# Patient Record
Sex: MALE | Race: WHITE | Employment: OTHER | URBAN - METROPOLITAN AREA
[De-identification: names, ages, dates, MRNs, and addresses within clinical notes are randomized per-mention and may not be internally consistent; named-entity substitution may affect disease eponyms.]

---

## 2020-11-11 PROBLEM — R42 DIZZINESS: Status: ACTIVE | Noted: 2020-11-11

## 2020-11-11 PROBLEM — H90.3 SENSORY HEARING LOSS, BILATERAL: Status: ACTIVE | Noted: 2020-11-11

## 2021-08-06 ENCOUNTER — LAB ENCOUNTER (OUTPATIENT)
Dept: LAB | Facility: HOSPITAL | Age: 71
End: 2021-08-06
Attending: INTERNAL MEDICINE
Payer: MEDICARE

## 2021-08-06 DIAGNOSIS — Z01.818 PRE-OP TESTING: ICD-10-CM

## 2021-08-07 LAB — SARS-COV-2 RNA RESP QL NAA+PROBE: NOT DETECTED

## 2021-08-09 PROBLEM — D12.2 BENIGN NEOPLASM OF ASCENDING COLON: Status: ACTIVE | Noted: 2021-08-09

## 2021-08-09 PROBLEM — D12.3 BENIGN NEOPLASM OF TRANSVERSE COLON: Status: ACTIVE | Noted: 2021-08-09

## 2021-08-09 PROBLEM — Z86.010 HISTORY OF ADENOMATOUS POLYP OF COLON: Status: ACTIVE | Noted: 2021-08-09

## 2021-09-20 ENCOUNTER — HOSPITAL ENCOUNTER (EMERGENCY)
Age: 71
Discharge: HOME | End: 2021-09-20
Payer: MEDICARE

## 2021-09-20 VITALS
RESPIRATION RATE: 18 BRPM | SYSTOLIC BLOOD PRESSURE: 130 MMHG | WEIGHT: 205 LBS | HEART RATE: 75 BPM | OXYGEN SATURATION: 98 % | DIASTOLIC BLOOD PRESSURE: 80 MMHG | HEIGHT: 72 IN | BODY MASS INDEX: 27.77 KG/M2 | TEMPERATURE: 99 F

## 2021-09-20 DIAGNOSIS — L23.7 POISON IVY DERMATITIS: Primary | ICD-10-CM

## 2021-09-20 PROCEDURE — 99283 EMERGENCY DEPT VISIT LOW MDM: CPT

## 2021-09-20 RX ORDER — PREDNISONE 10 MG/1
TABLET ORAL
Qty: 29 TABLET | Refills: 0 | Status: SHIPPED | OUTPATIENT
Start: 2021-09-20

## 2021-09-20 NOTE — ED PROVIDER NOTES
Patient Seen in: THE [facility] In [place]      History   Patient presents with:  Swelling Edema    Stated Complaint: swelling to face, possible reaction to medication for poison ivy    Subjective:   HPI    75-year-old gentleman.   He arri Device None (Room air)       Current:/80   Pulse 75   Temp 99.2 °F (37.3 °C) (Temporal)   Resp 18   Ht 182.9 cm (6')   Wt 93 kg   SpO2 98%   BMI 27.80 kg/m²         Physical Exam    Gen: Well appearing, well groomed, alert and aware x 3  Neck: Supple

## 2023-12-04 ENCOUNTER — TELEPHONE (OUTPATIENT)
Dept: NEUROLOGY | Facility: CLINIC | Age: 73
End: 2023-12-04

## 2023-12-04 NOTE — TELEPHONE ENCOUNTER
Pt dropped off imaging of Brain^ rouine dos 7/18/2023 from [facility] imaging uploded successfully to pacs and retured to pt; rcvd mri brain w/o contrast reports from [facility]. 07/18/2023 placed in nursing bin for  and review.

## 2024-03-07 ENCOUNTER — OFFICE VISIT (OUTPATIENT)
Dept: NEUROLOGY | Facility: CLINIC | Age: 74
End: 2024-03-07
Payer: MEDICARE

## 2024-03-07 VITALS
HEART RATE: 84 BPM | BODY MASS INDEX: 30 KG/M2 | RESPIRATION RATE: 16 BRPM | SYSTOLIC BLOOD PRESSURE: 128 MMHG | OXYGEN SATURATION: 96 % | WEIGHT: 221 LBS | DIASTOLIC BLOOD PRESSURE: 80 MMHG

## 2024-03-07 DIAGNOSIS — R42 VERTIGO: Primary | ICD-10-CM

## 2024-03-07 PROCEDURE — 99204 OFFICE O/P NEW MOD 45 MIN: CPT | Performed by: OTHER

## 2024-03-07 RX ORDER — POTASSIUM CHLORIDE 1500 MG/1
1 TABLET, EXTENDED RELEASE ORAL DAILY
COMMUNITY

## 2024-03-07 RX ORDER — MECLIZINE HYDROCHLORIDE 25 MG/1
12.5 TABLET ORAL DAILY
COMMUNITY
Start: 2023-07-17

## 2024-03-07 NOTE — PATIENT INSTRUCTIONS
Refill policies:    Allow 2-3 business days for refills; controlled substances may take longer.  Contact your pharmacy at least 5 days prior to running out of medication and have them send an electronic request or submit request through the “request refill” option in your Issuu account.  Refills are not addressed on weekends; covering physicians do not authorize routine medications on weekends.  No narcotics or controlled substances are refilled after noon on Fridays or by on call physicians.  By law, narcotics must be electronically prescribed.  A 30 day supply with no refills is the maximum allowed.  If your prescription is due for a refill, you may be due for a follow up appointment.  To best provide you care, patients receiving routine medications need to be seen at least once a year.  Patients receiving narcotic/controlled substance medications need to be seen at least once every 3 months.  In the event that your preferred pharmacy does not have the requested medication in stock (e.g. Backordered), it is your responsibility to find another pharmacy that has the requested medication available.  We will gladly send a new prescription to that pharmacy at your request.    Scheduling Tests:    If your physician has ordered radiology tests such as MRI or CT scans, please contact Central Scheduling at [phone] right away to schedule the test.  Once scheduled, the [facility] Centralized Referral Team will work with your insurance carrier to obtain pre-certification or prior authorization.  Depending on your insurance carrier, approval may take 3-10 days.  It is highly recommended patients assure they have received an authorization before having a test performed.  If test is done without insurance authorization, patient may be responsible for the entire amount billed.      Precertification and Prior Authorizations:  If your physician has recommended that you have a procedure or additional testing performed the [facility]  Centralized Referral Team will contact your insurance carrier to obtain pre-certification or prior authorization.    You are strongly encouraged to contact your insurance carrier to verify that your procedure/test has been approved and is a COVERED benefit.  Although the [facility] Centralized Referral Team does its due diligence, the insurance carrier gives the disclaimer that \"Although the procedure is authorized, this does not guarantee payment.\"    Ultimately the patient is responsible for payment.   Thank you for your understanding in this matter.  Paperwork Completion:  If you require FMLA or disability paperwork for your recovery, please make sure to either drop it off or have it faxed to our office at [phone]. Be sure the form has your name and date of birth on it.  The form will be faxed to our Forms Department and they will complete it for you.  There is a 25$ fee for all forms that need to be filled out.  Please be aware there is a 10-14 day turnaround time.  You will need to sign a release of information (JADE) form if your paperwork does not come with one.  You may call the Forms Department with any questions at [phone].  Their fax number is [phone].

## 2024-03-07 NOTE — PROGRESS NOTES
Patient states daily morning POLANCO. Patient believes no changes in his memory. Patient states dizziness occurs often. Denies recent head trauma. Denies changes in speech or balance.

## 2024-03-07 NOTE — H&P
Neurology H&P    Fredi Siddiqui Patient Status:  No patient class for patient encounter    1950 MRN [id number]   Location [facility], [address] [place] [address] Attending No att. providers found   Hosp Day # 0 PCP MAYA OLMOS     Subjective:  Fredi Siddiqui is a(n) 73 year old male with a past medical history significant for hearing loss, dizziness, HL, vitamin B12 deficiency, and who presents to neurology clinic for evaluation of memory loss and vertigo. He states that he had an MRI ordered by his PCP and this report is in EPIC. GHe states that he has had lots of vertigo over the past few years. He has seen an ENT. He states that he was told that he may have crystals in his ears. He takes meclizine for vertigo. He gets a room spinning sensation and has had falls and vomiting with his vertigo. This vertigo last a day or two and then goes away. He has seen PT for this and tells me that his exam was consistent with BPPV. His MRI of the brain did not show any specific lesions that would explain his lesions. He does have some moderate microvascular disease and cortical atrophy. No acute lesions.    Current Medications:  Current Outpatient Medications   Medication Sig Dispense Refill    Potassium Chloride ER 20 MEQ Oral Tab CR Take 1 tablet by mouth daily.      meclizine 25 MG Oral Tab Take 0.5 tablets (12.5 mg total) by mouth daily.      Cyanocobalamin (VITAMIN B-12 OR) Take by mouth.      MAGNESIUM OR Take by mouth.      lisinopril 20 MG Oral Tab Take 1 tablet (20 mg total) by mouth daily.      atorvastatin 20 MG Oral Tab Take 1 tablet (20 mg total) by mouth nightly.      chlorthalidone 50 MG Oral Tab Take 1 tablet (50 mg total) by mouth daily.      tamsulosin (FLOMAX) cap Take by mouth daily.         Problem List:  Patient Active Problem List   Diagnosis    Sensory hearing loss, bilateral    Dizziness    History of adenomatous polyp of colon    Benign neoplasm of ascending colon    Benign  neoplasm of transverse colon       PMHx:  Past Medical History:   Diagnosis Date    Arthritis     Belching     Calculus of kidney     Diarrhea, unspecified     Dizziness     Flatulence/gas pain/belching     Frequent UTI     Hemorrhoids     High cholesterol     History of cardiac murmur     History of rheumatic fever     Wears glasses        PSHx:  Past Surgical History:   Procedure Laterality Date    TONSILLECTOMY         SocHx:  Social History     Socioeconomic History    Marital status:    Tobacco Use    Smoking status: Never    Smokeless tobacco: Never   Vaping Use    Vaping Use: Never used   Substance and Sexual Activity    Alcohol use: Yes     Alcohol/week: 1.0 standard drink of alcohol     Types: 1 Cans of beer per week     Comment: occ    Drug use: Never   Other Topics Concern    Caffeine Concern Yes     Comment: coffee 2 cups    Exercise No       Family History:  Family History   Problem Relation Age of Onset    Colon Cancer Father     Diabetes Mother     Stroke Maternal Grandmother     Breast Cancer Sister            ROS:  10 point ROS completed and was negative, except for pertinent positive and negatives stated in subjective.    Objective/Physical Exam:    Vital Signs:  Blood pressure 128/80, pulse 84, resp. rate 16, weight 221 lb (100.2 kg), SpO2 96%.    Gen: Awake and in no apparent distress  HEENT: moist mucus membranes  Neck: Supple  Cardiovascular: Regular rate and rhythm, no murmur  Pulm: CTAB  GI: non-tender, normal bowel sounds  Skin: normal, dry  Extremities: No clubbing or cyanosis      Neurologic:   MENTAL STATUS: alert, ox3, normal attention, language and fund of knowledge.      CRANIAL NERVES II to XII: PERRLA, no ptosis or diplopia, EOM intact, facial sensation intact, strong eye closure, face is symmetric, no dysarthria, tongue midline,  no tongue fasciculations or atrophy, strong shoulder shrug.    MOTOR EXAMINATION: normal tone, no fasciculations, normal strength throughout in UEs  and LEs except.    SENSORY EXAMINATION:  UE: intact to light touch, pinprick intact  LE: intact to light touch, pinprick intact    COORDINATION:  No dysmetria, or intention tremors     REFLEXES: 2+ at biceps, 2+ brachioradialis, 2+ at patella     GAIT: normal stance, normal toe gait and heel gait, tandem well.    Romberg's: negative        Labs:       Imaging:  No CNS imaging to review  MRI brain report in EPIC and noted moderate microvascular disease and parenchymal atrophy    Assessment:  This is a 73-year-old male with episodic vertigo.  He has had this for a number of years.  His description of events with a room spinning sensation that is intermittent can last up to a day and is relieved by meclizine and sometimes accompanied by vomiting or off balance and falls does indeed sound like BPPV potentially.  He states he is seen physical therapy for vestibular evaluation the past and was told he has BPPV.  He is also seen by ENT who also suggest he has BPPV.  Since meclizine is working for him well to control the vertigo when he does get it I would have him continue meclizine for vertigo treatments at this point time.  We did discuss his MRI in detail and reviewed the images together today.  He does have a moderate amount of microvascular disease in the mild to moderate parenchymal atrophy but nothing specific.  No evidence of any vestibular schwannoma acoustic neuroma etc.      Plan:  Vertigo - BPPV  - MRI of the brain was reviewed  - Meclizine for breakthrough vertigo  - Has seen PT in the past    Ignacio Rodriguez,   Neurology

## 2024-07-09 ENCOUNTER — OFFICE VISIT (OUTPATIENT)
Dept: NEUROLOGY | Facility: CLINIC | Age: 74
End: 2024-07-09
Payer: MEDICARE

## 2024-07-09 VITALS
WEIGHT: 213.88 LBS | SYSTOLIC BLOOD PRESSURE: 122 MMHG | BODY MASS INDEX: 29 KG/M2 | RESPIRATION RATE: 16 BRPM | HEART RATE: 76 BPM | DIASTOLIC BLOOD PRESSURE: 63 MMHG

## 2024-07-09 DIAGNOSIS — R42 VERTIGO: Primary | ICD-10-CM

## 2024-07-09 PROCEDURE — 99213 OFFICE O/P EST LOW 20 MIN: CPT | Performed by: OTHER

## 2024-07-09 NOTE — PATIENT INSTRUCTIONS
After your visit at the [facility] today,  please direct any follow up questions or medication needs to the staff in our [place] office so that your concerns may be promptly addressed.  We are available through [employer] or at the numbers below:    The phone number is:   [phone] option #1    The fax number is:  [phone]    Your pharmacy should also send any requests electronically to the [place] office.  Refill policies:    Allow 2-3 business days for refills; controlled substances may take longer.  Contact your pharmacy at least 5 days prior to running out of medication and have them send an electronic request or submit request through the “request refill” option in your [employer] account.  Refills are not addressed on weekends; covering physicians do not authorize routine medications on weekends.  No narcotics or controlled substances are refilled after noon on Fridays or by on call physicians.  By law, narcotics must be electronically prescribed.  A 30 day supply with no refills is the maximum allowed.  If your prescription is due for a refill, you may be due for a follow up appointment.  To best provide you care, patients receiving routine medications need to be seen at least once a year.  Patients receiving narcotic/controlled substance medications need to be seen at least once every 3 months.  In the event that your preferred pharmacy does not have the requested medication in stock (e.g. Backordered), it is your responsibility to find another pharmacy that has the requested medication available.  We will gladly send a new prescription to that pharmacy at your request.    Scheduling Tests:    If your physician has ordered radiology tests such as MRI or CT scans, please contact Central Scheduling at [phone] right away to schedule the test.  Once scheduled, the [facility] Centralized Referral Team will work with your insurance carrier to obtain pre-certification or prior authorization.   Depending on your insurance carrier, approval may take 3-10 days.  It is highly recommended patients assure they have received an authorization before having a test performed.  If test is done without insurance authorization, patient may be responsible for the entire amount billed.      Precertification and Prior Authorizations:  If your physician has recommended that you have a procedure or additional testing performed the [facility] Centralized Referral Team will contact your insurance carrier to obtain pre-certification or prior authorization.    You are strongly encouraged to contact your insurance carrier to verify that your procedure/test has been approved and is a COVERED benefit.  Although the [facility] Centralized Referral Team does its due diligence, the insurance carrier gives the disclaimer that \"Although the procedure is authorized, this does not guarantee payment.\"    Ultimately the patient is responsible for payment.   Thank you for your understanding in this matter.  Paperwork Completion:  If you require FMLA or disability paperwork for your recovery, please make sure to either drop it off or have it faxed to our office at [phone]. Be sure the form has your name and date of birth on it.  The form will be faxed to our Forms Department and they will complete it for you.  There is a 25$ fee for all forms that need to be filled out.  Please be aware there is a 10-14 day turnaround time.  You will need to sign a release of information (JADE) form if your paperwork does not come with one.  You may call the Forms Department with any questions at [phone].  Their fax number is [phone].

## 2024-07-09 NOTE — PROGRESS NOTES
Pt states he has had two episodes of dizziness since last visit.  Last episode was 7/2/2024 and lasted 6 hours.

## 2024-07-09 NOTE — PROGRESS NOTES
Neurology H&P    Fredi Siddiqui Patient Status:  No patient class for patient encounter    1950 MRN [id number]   Location [facility], [address] [place] [address] Attending No att. providers found   Hosp Day # 0 PCP MAYA OLMOS     Subjective:  Fredi Siddiqui is a(n) 74 year old male with a past medical history significant for hearing loss, dizziness, HL, vitamin B12 deficiency, and who presents to neurology clinic for evaluation of memory loss and vertigo. He states that he had an MRI ordered by his PCP and this report is in EPIC. GHe states that he has had lots of vertigo over the past few years. He has seen an ENT. He states that he was told that he may have crystals in his ears. He takes meclizine for vertigo. He gets a room spinning sensation and has had falls and vomiting with his vertigo. This vertigo last a day or two and then goes away. He has seen PT for this and tells me that his exam was consistent with BPPV. His MRI of the brain did not show any specific lesions that would explain his lesions. He does have some moderate microvascular disease and cortical atrophy. No acute lesions.    Interim history:  Patient was last seen in clinic on 3/7/2024 for vertigo.  At that time we continued meclizine for BPPV.  He comes back today for follow-up. He states that he has had 2 bouts of vertigo since seeing me last. He took meclizine for those episodes. He is still doing the Eplys regularly. He does report that he usually gets a headache the day after this vertigo which lasts a few days but they do not have any migrainous features. .     Current Medications:  Current Outpatient Medications   Medication Sig Dispense Refill    Potassium Chloride ER 20 MEQ Oral Tab CR Take 1 tablet by mouth daily.      meclizine 25 MG Oral Tab Take 0.5 tablets (12.5 mg total) by mouth daily.      Cyanocobalamin (VITAMIN B-12 OR) Take by mouth.      MAGNESIUM OR Take by mouth.      lisinopril 20 MG Oral Tab Take 1  tablet (20 mg total) by mouth daily.      atorvastatin 20 MG Oral Tab Take 1 tablet (20 mg total) by mouth nightly.      chlorthalidone 50 MG Oral Tab Take 1 tablet (50 mg total) by mouth daily.      tamsulosin (FLOMAX) cap Take by mouth daily.         Problem List:  Patient Active Problem List   Diagnosis    Sensory hearing loss, bilateral    Vertigo    History of adenomatous polyp of colon    Benign neoplasm of ascending colon    Benign neoplasm of transverse colon       PMHx:  Past Medical History:    Arthritis    Belching    Calculus of kidney    Diarrhea, unspecified    Dizziness    Flatulence/gas pain/belching    Frequent UTI    Hemorrhoids    High cholesterol    History of cardiac murmur    History of rheumatic fever    Wears glasses       PSHx:  Past Surgical History:   Procedure Laterality Date    Tonsillectomy         SocHx:  Social History     Socioeconomic History    Marital status:    Tobacco Use    Smoking status: Never    Smokeless tobacco: Never   Vaping Use    Vaping status: Never Used   Substance and Sexual Activity    Alcohol use: Yes     Alcohol/week: 1.0 standard drink of alcohol     Types: 1 Cans of beer per week     Comment: occ    Drug use: Never   Other Topics Concern    Caffeine Concern Yes     Comment: coffee 2 cups    Exercise No       Family History:  Family History   Problem Relation Age of Onset    Colon Cancer Father     Diabetes Mother     Stroke Maternal Grandmother     Breast Cancer Sister            ROS:  10 point ROS completed and was negative, except for pertinent positive and negatives stated in subjective.    Objective/Physical Exam:    Vital Signs:  There were no vitals taken for this visit.    Gen: Awake and in no apparent distress  HEENT: moist mucus membranes  Neck: Supple  Cardiovascular: Regular rate and rhythm, no murmur  Pulm: CTAB  GI: non-tender, normal bowel sounds  Skin: normal, dry  Extremities: No clubbing or cyanosis      Neurologic:   MENTAL STATUS:  alert, ox3, normal attention, language and fund of knowledge.      CRANIAL NERVES II to XII: PERRLA, no ptosis or diplopia, EOM intact, facial sensation intact, strong eye closure, face is symmetric, no dysarthria, tongue midline,  no tongue fasciculations or atrophy, strong shoulder shrug.    MOTOR EXAMINATION: normal tone, no fasciculations, normal strength throughout in UEs and LEs except.    SENSORY EXAMINATION:  UE: intact to light touch, pinprick intact  LE: intact to light touch, pinprick intact    COORDINATION:  No dysmetria, or intention tremors     REFLEXES: 2+ at biceps, 2+ brachioradialis, 2+ at patella     GAIT: normal stance, normal toe gait and heel gait, tandem well.    Romberg's: negative        Labs:       Imaging:  No CNS imaging to review  MRI brain report in EPIC and noted moderate microvascular disease and parenchymal atrophy    Assessment:  This is a 73-year-old male with episodic vertigo.  He has had this for a number of years.  His description of events with a room spinning sensation that is intermittent can last up to a day and is relieved by meclizine and sometimes accompanied by vomiting or off balance and falls does indeed sound like BPPV potentially.  He can continue Meclizine for BPPV.       Plan:  Vertigo - BPPV  - MRI of the brain was reviewed  - Meclizine for breakthrough vertigo  - Has seen PT in the past    Ignacio Rodriguez DO  Neurology

## 2024-07-10 ENCOUNTER — TELEPHONE (OUTPATIENT)
Dept: NEUROLOGY | Facility: CLINIC | Age: 74
End: 2024-07-10

## 2024-07-10 NOTE — TELEPHONE ENCOUNTER
Pt brought outside lab reports to office visit on 7/9/2024.    Reviewed by provider and copy sent to scanning.